# Patient Record
Sex: FEMALE | Race: WHITE | NOT HISPANIC OR LATINO | Employment: FULL TIME | URBAN - METROPOLITAN AREA
[De-identification: names, ages, dates, MRNs, and addresses within clinical notes are randomized per-mention and may not be internally consistent; named-entity substitution may affect disease eponyms.]

---

## 2018-12-01 ENCOUNTER — HOSPITAL ENCOUNTER (OUTPATIENT)
Dept: LAB | Facility: HOSPITAL | Age: 33
Discharge: HOME | End: 2018-12-01
Attending: OBSTETRICS & GYNECOLOGY | Admitting: OBSTETRICS & GYNECOLOGY

## 2018-12-01 LAB
ABO + RH BLD: NORMAL
ARMBAND: NORMAL
BASOPHILS # BLD AUTO: 0.1 10*3/UL (ref 0–0.2)
BASOPHILS NFR BLD AUTO: 1 % (ref 0–2)
BLD COMPONENT TYPE: NORMAL
BLD GP AB SCN SERPL QL: NEGATIVE
CROSSMATCH EXPIRATION: NORMAL
DIFFERENTIAL METHOD BLD: (no result)
EOSINOPHIL # BLD AUTO: 0.1 10*3/UL (ref 0–0.3)
EOSINOPHIL # BLD AUTO: 1 % (ref 0–3)
ERYTHROCYTE [DISTWIDTH] IN BLOOD BY AUTOMATED COUNT: 14.4 % (ref 11.5–14.5)
HCG INTACT+B SERPL-ACNC: <1 MIU/ML
HCT VFR BLD AUTO: 41.4 % (ref 35–49)
HGB BLD-MCNC: 13.7 G/DL (ref 12–15)
LYMPHOCYTES # BLD AUTO: 1.8 10*3/UL (ref 0.8–4.8)
LYMPHOCYTES NFR BLD AUTO: 21 % (ref 18–42)
MCH RBC QN AUTO: 29.5 PG (ref 26–32)
MCHC RBC AUTO-ENTMCNC: 33.2 G/DL (ref 32–36)
MCV RBC AUTO: 89 FL (ref 80–94)
MONOCYTES # BLD AUTO: 0.6 10*3/UL (ref 0.1–1.3)
MONOCYTES NFR BLD AUTO: 6 % (ref 2–11)
NEUTROPHILS # BLD AUTO: 6.2 10*3/UL (ref 2.3–8.6)
NEUTROPHILS NFR BLD AUTO: 71 % (ref 50–75)
NRBC BLD AUTO-RTO: 0 /100{WBCS}
NRBC/RBC NFR BLD MANUAL: 0 10*3/UL
PLATELET # BLD AUTO: 315 10*3/UL (ref 150–450)
PMV BLD AUTO: 8.1 FL (ref 7.4–10.4)
RBC # BLD AUTO: 4.65 10*6/UL (ref 4–5.4)
WBC # BLD AUTO: 8.7 10*3/UL (ref 4.5–11.5)

## 2018-12-06 ENCOUNTER — HOSPITAL ENCOUNTER (OUTPATIENT)
Dept: OTHER | Facility: HOSPITAL | Age: 33
Setting detail: SPECIMEN
Discharge: HOME | End: 2018-12-06
Attending: OBSTETRICS & GYNECOLOGY | Admitting: OBSTETRICS & GYNECOLOGY

## 2019-12-13 ENCOUNTER — OFFICE VISIT (OUTPATIENT)
Dept: SURGERY | Facility: CLINIC | Age: 34
End: 2019-12-13

## 2019-12-13 ENCOUNTER — PREP FOR SURGERY (OUTPATIENT)
Dept: OTHER | Facility: HOSPITAL | Age: 34
End: 2019-12-13

## 2019-12-13 VITALS
DIASTOLIC BLOOD PRESSURE: 78 MMHG | HEIGHT: 65 IN | OXYGEN SATURATION: 98 % | BODY MASS INDEX: 25.49 KG/M2 | WEIGHT: 153 LBS | HEART RATE: 70 BPM | SYSTOLIC BLOOD PRESSURE: 105 MMHG

## 2019-12-13 DIAGNOSIS — K43.2 INCISIONAL HERNIA, WITHOUT OBSTRUCTION OR GANGRENE: Primary | ICD-10-CM

## 2019-12-13 PROCEDURE — 99204 OFFICE O/P NEW MOD 45 MIN: CPT | Performed by: SURGERY

## 2019-12-13 NOTE — H&P
Gretchen Gaytan is a 34 y.o. female.   No chief complaint on file.    There were no vitals taken for this visit.    HISTORY OF PRESENT ILLNESS:  Patient is a very pleasant 34-year-old female who is scheduled for a vaginal hysterectomy with Dr. Mijares.  She was referred here for consideration of repair of a chronically incarcerated periumbilical incisional hernia at the same time.  She reports that the hernia has been present since the birth of her second child about 10 years ago.  It remains chronically incarcerated and sometimes is bigger and sometimes a smaller.  She denies obstructive symptoms.  She is tender in this area.      The following portions of the patient's history were reviewed and updated as appropriate: allergies, current medications, past family history, past medical history, past social history, past surgical history and problem list.    Review of Systems   Constitutional: Negative for activity change and diaphoresis.   HENT: Negative.  Negative for sore throat and voice change.    Eyes: Negative for blurred vision.   Respiratory: Negative for wheezing.    Cardiovascular: Negative for chest pain.   Gastrointestinal:        Periumbilical incisional hernia tenderness   Endocrine: Negative.  Negative for polyuria.   Genitourinary: Negative for urinary incontinence.   Musculoskeletal: Negative for arthralgias.   Skin: Negative for color change.   Neurological: Negative for dizziness, speech difficulty and confusion.   Hematological: Does not bruise/bleed easily.   Psychiatric/Behavioral: Negative for agitation.       Objective   Physical Exam   Constitutional: She is oriented to person, place, and time. She appears well-developed and well-nourished.   HENT:   Head: Normocephalic and atraumatic.   Eyes: EOM are normal.   Neck: Normal range of motion.   Cardiovascular: Normal rate.   Pulmonary/Chest: Effort normal.   Abdominal: Soft.   Chronically incarcerated incisional hernia, small,  tender   Musculoskeletal: Normal range of motion.   Neurological: She is alert and oriented to person, place, and time.   Skin: Skin is warm and dry.   Psychiatric: She has a normal mood and affect.         Assessment/Plan   There are no diagnoses linked to this encounter.Please schedule open incisional hernia repair with mesh, coordinate with Dr. Mijares for her vaginal hysterectomy.  Discussed the risk of general anesthesia bleeding infection injury to surrounding structures.  Also discussed that we may or may not use mesh depending on the size of the actual hernia defect.    Gill Garcia MD  12/13/2019  10:49 AM

## 2019-12-20 PROBLEM — D06.9 CIN III WITH SEVERE DYSPLASIA: Status: ACTIVE | Noted: 2019-12-20

## 2020-01-14 ENCOUNTER — APPOINTMENT (OUTPATIENT)
Dept: PREADMISSION TESTING | Facility: HOSPITAL | Age: 35
End: 2020-01-14

## 2020-01-14 VITALS
HEIGHT: 65 IN | DIASTOLIC BLOOD PRESSURE: 71 MMHG | HEART RATE: 75 BPM | BODY MASS INDEX: 23.72 KG/M2 | SYSTOLIC BLOOD PRESSURE: 105 MMHG | WEIGHT: 142.4 LBS | OXYGEN SATURATION: 99 %

## 2020-01-14 DIAGNOSIS — Z00.00 ROUTINE GENERAL MEDICAL EXAMINATION AT A HEALTH CARE FACILITY: Primary | ICD-10-CM

## 2020-01-14 LAB
ABO GROUP BLD: NORMAL
BLD GP AB SCN SERPL QL: NEGATIVE
DEPRECATED RDW RBC AUTO: 41.1 FL (ref 37–54)
ERYTHROCYTE [DISTWIDTH] IN BLOOD BY AUTOMATED COUNT: 13 % (ref 12.3–15.4)
HCT VFR BLD AUTO: 43.5 % (ref 34–46.6)
HGB BLD-MCNC: 14.5 G/DL (ref 12–15.9)
MCH RBC QN AUTO: 30 PG (ref 26.6–33)
MCHC RBC AUTO-ENTMCNC: 33.4 G/DL (ref 31.5–35.7)
MCV RBC AUTO: 89.6 FL (ref 79–97)
MRSA DNA SPEC QL NAA+PROBE: NORMAL
PLATELET # BLD AUTO: 389 10*3/MM3 (ref 140–450)
PMV BLD AUTO: 7.8 FL (ref 6–12)
RBC # BLD AUTO: 4.85 10*6/MM3 (ref 3.77–5.28)
RH BLD: POSITIVE
T&S EXPIRATION DATE: NORMAL
WBC NRBC COR # BLD: 11.1 10*3/MM3 (ref 3.4–10.8)

## 2020-01-14 PROCEDURE — 86900 BLOOD TYPING SEROLOGIC ABO: CPT | Performed by: SURGERY

## 2020-01-14 PROCEDURE — 87641 MR-STAPH DNA AMP PROBE: CPT | Performed by: SURGERY

## 2020-01-14 PROCEDURE — 86850 RBC ANTIBODY SCREEN: CPT | Performed by: SURGERY

## 2020-01-14 PROCEDURE — 86901 BLOOD TYPING SEROLOGIC RH(D): CPT | Performed by: SURGERY

## 2020-01-14 PROCEDURE — 85027 COMPLETE CBC AUTOMATED: CPT | Performed by: SURGERY

## 2020-01-14 PROCEDURE — 86901 BLOOD TYPING SEROLOGIC RH(D): CPT

## 2020-01-14 PROCEDURE — 36415 COLL VENOUS BLD VENIPUNCTURE: CPT

## 2020-01-14 PROCEDURE — 93005 ELECTROCARDIOGRAM TRACING: CPT

## 2020-01-14 PROCEDURE — 86900 BLOOD TYPING SEROLOGIC ABO: CPT

## 2020-01-15 PROCEDURE — 93010 ELECTROCARDIOGRAM REPORT: CPT | Performed by: INTERNAL MEDICINE

## 2020-01-17 ENCOUNTER — PREP FOR SURGERY (OUTPATIENT)
Dept: OTHER | Facility: HOSPITAL | Age: 35
End: 2020-01-17

## 2020-01-17 NOTE — H&P
Patient Care Team:  Marylou Merdeith as PCP - General    Chief complaint Persistent dysplasia    Subjective     Patient is a 34 y.o. female presents for scheduled hysterectomy d/t persistent/ recurrent cervical dysplasia.  In  had high grade dysplasia but had not completed childbearing so had a LEEP.  She had somewhat consistent follow up after that time and most recent pap was ASCUS HPV positive with colposcopy showing high grade dysplasia at 12 oclock with p 16 positive.  LEEP was benign.  Has now completed childbearing, has had a tubal and salpingectomy and desires permanent surgical mgmt of recurrent dysplasia.  Plans to also have hernia repair c Dr. Garcia at same time as hysterectomy.      Review of Systems   Pertinent items are noted in HPI    History    OB Hx Term  x 3 (, , ) Ectopic c salpingectomy     Past Medical History:   Diagnosis Date   • Ruptured, fallopian tube 2018    right side      Past Surgical History:   Procedure Laterality Date   • BLADDER SUSPENSION     • OTHER SURGICAL HISTORY      uterus ablation    • SALPINGECTOMY Right 2018    right    • TUBAL ABDOMINAL LIGATION Left 2019     No family history on file.  Social History     Tobacco Use   • Smoking status: Never Smoker   • Smokeless tobacco: Never Used   Substance Use Topics   • Alcohol use: Yes     Comment: socially    • Drug use: Never       Allergies  Allergies   Allergen Reactions   • Contrast Dye Itching       Medications    (Not in a hospital admission)    Objective     Vital Signs  There were no vitals filed for this visit.    Physical Exam:      General Appearance:    Alert, cooperative, in no acute distress   Lungs:     Clear to auscultation,respirations regular.    Heart:    Regular rhythm and normal rate.   Abdomen:     Normal bowel sounds, no masses, soft non-tender, non-distended, no guarding, no rebound tenderness   Genitalia:    cx smooth / closed, uterus not enlarged, adnexae wnl.     Extremities:   Moves all extremities well, no edema, no cyanosis, no              redness         Assessment/Plan       35 yo c recurrent cervical dysplasia desiring permanent surgical mgmt.  Plan for TVH.    R/B/A reviewed, see office notes for full counselling.        Annette Mijares MD  01/17/20  11:59 AM

## 2020-01-17 NOTE — H&P (VIEW-ONLY)
Patient Care Team:  Marylou Meredith as PCP - General    Chief complaint Persistent dysplasia    Subjective     Patient is a 34 y.o. female presents for scheduled hysterectomy d/t persistent/ recurrent cervical dysplasia.  In  had high grade dysplasia but had not completed childbearing so had a LEEP.  She had somewhat consistent follow up after that time and most recent pap was ASCUS HPV positive with colposcopy showing high grade dysplasia at 12 oclock with p 16 positive.  LEEP was benign.  Has now completed childbearing, has had a tubal and salpingectomy and desires permanent surgical mgmt of recurrent dysplasia.  Plans to also have hernia repair c Dr. Garcia at same time as hysterectomy.      Review of Systems   Pertinent items are noted in HPI    History    OB Hx Term  x 3 (, , ) Ectopic c salpingectomy     Past Medical History:   Diagnosis Date   • Ruptured, fallopian tube 2018    right side      Past Surgical History:   Procedure Laterality Date   • BLADDER SUSPENSION     • OTHER SURGICAL HISTORY      uterus ablation    • SALPINGECTOMY Right 2018    right    • TUBAL ABDOMINAL LIGATION Left 2019     No family history on file.  Social History     Tobacco Use   • Smoking status: Never Smoker   • Smokeless tobacco: Never Used   Substance Use Topics   • Alcohol use: Yes     Comment: socially    • Drug use: Never       Allergies  Allergies   Allergen Reactions   • Contrast Dye Itching       Medications    (Not in a hospital admission)    Objective     Vital Signs  There were no vitals filed for this visit.    Physical Exam:      General Appearance:    Alert, cooperative, in no acute distress   Lungs:     Clear to auscultation,respirations regular.    Heart:    Regular rhythm and normal rate.   Abdomen:     Normal bowel sounds, no masses, soft non-tender, non-distended, no guarding, no rebound tenderness   Genitalia:    cx smooth / closed, uterus not enlarged, adnexae wnl.     Extremities:   Moves all extremities well, no edema, no cyanosis, no              redness         Assessment/Plan       35 yo c recurrent cervical dysplasia desiring permanent surgical mgmt.  Plan for TVH.    R/B/A reviewed, see office notes for full counselling.        Annette Mijares MD  01/17/20  11:59 AM

## 2020-01-20 ENCOUNTER — ANESTHESIA EVENT (OUTPATIENT)
Dept: PERIOP | Facility: HOSPITAL | Age: 35
End: 2020-01-20

## 2020-01-21 ENCOUNTER — ANESTHESIA (OUTPATIENT)
Dept: PERIOP | Facility: HOSPITAL | Age: 35
End: 2020-01-21

## 2020-01-21 ENCOUNTER — HOSPITAL ENCOUNTER (INPATIENT)
Facility: HOSPITAL | Age: 35
LOS: 1 days | Discharge: HOME | End: 2020-01-22
Attending: OBSTETRICS & GYNECOLOGY | Admitting: OBSTETRICS & GYNECOLOGY

## 2020-01-21 DIAGNOSIS — D06.9 CIN III WITH SEVERE DYSPLASIA: ICD-10-CM

## 2020-01-21 DIAGNOSIS — K43.2 INCISIONAL HERNIA, WITHOUT OBSTRUCTION OR GANGRENE: ICD-10-CM

## 2020-01-21 LAB
ANION GAP SERPL CALCULATED.3IONS-SCNC: 9 MMOL/L (ref 5–15)
B-HCG UR QL: NEGATIVE
BUN BLD-MCNC: 16 MG/DL (ref 6–20)
BUN/CREAT SERPL: 18.6 (ref 7–25)
CALCIUM SPEC-SCNC: 9 MG/DL (ref 8.6–10.5)
CHLORIDE SERPL-SCNC: 105 MMOL/L (ref 98–107)
CO2 SERPL-SCNC: 25 MMOL/L (ref 22–29)
CREAT BLD-MCNC: 0.86 MG/DL (ref 0.57–1)
GFR SERPL CREATININE-BSD FRML MDRD: 76 ML/MIN/1.73
GLUCOSE BLD-MCNC: 94 MG/DL (ref 65–99)
POTASSIUM BLD-SCNC: 4.1 MMOL/L (ref 3.5–5.2)
SODIUM BLD-SCNC: 139 MMOL/L (ref 136–145)

## 2020-01-21 PROCEDURE — 25010000002 MIDAZOLAM PER 1 MG: Performed by: ANESTHESIOLOGY

## 2020-01-21 PROCEDURE — 25010000002 HYDROMORPHONE PER 4 MG: Performed by: ANESTHESIOLOGY

## 2020-01-21 PROCEDURE — 0WUF0JZ SUPPLEMENT ABDOMINAL WALL WITH SYNTHETIC SUBSTITUTE, OPEN APPROACH: ICD-10-PCS | Performed by: SURGERY

## 2020-01-21 PROCEDURE — 49568 PR IMPLANT MESH HERNIA REPAIR/DEBRIDEMENT CLOSURE: CPT | Performed by: SURGERY

## 2020-01-21 PROCEDURE — 0UT67ZZ RESECTION OF LEFT FALLOPIAN TUBE, VIA NATURAL OR ARTIFICIAL OPENING: ICD-10-PCS | Performed by: OBSTETRICS & GYNECOLOGY

## 2020-01-21 PROCEDURE — 49561 PR REPAIR INCISIONAL HERNIA,STRANG: CPT | Performed by: SURGERY

## 2020-01-21 PROCEDURE — 25010000002 PHENYLEPHRINE 10 MG/ML SOLUTION: Performed by: ANESTHESIOLOGY

## 2020-01-21 PROCEDURE — 25010000002 KETOROLAC TROMETHAMINE PER 15 MG: Performed by: ANESTHESIOLOGY

## 2020-01-21 PROCEDURE — 81025 URINE PREGNANCY TEST: CPT | Performed by: OBSTETRICS & GYNECOLOGY

## 2020-01-21 PROCEDURE — C1781 MESH (IMPLANTABLE): HCPCS | Performed by: OBSTETRICS & GYNECOLOGY

## 2020-01-21 PROCEDURE — 94762 N-INVAS EAR/PLS OXIMTRY CONT: CPT

## 2020-01-21 PROCEDURE — 25010000002 PROPOFOL 200 MG/20ML EMULSION: Performed by: ANESTHESIOLOGY

## 2020-01-21 PROCEDURE — 25010000002 ONDANSETRON PER 1 MG: Performed by: ANESTHESIOLOGY

## 2020-01-21 PROCEDURE — 88307 TISSUE EXAM BY PATHOLOGIST: CPT | Performed by: OBSTETRICS & GYNECOLOGY

## 2020-01-21 PROCEDURE — 0UT97ZZ RESECTION OF UTERUS, VIA NATURAL OR ARTIFICIAL OPENING: ICD-10-PCS | Performed by: OBSTETRICS & GYNECOLOGY

## 2020-01-21 PROCEDURE — 25010000002 FENTANYL CITRATE (PF) 250 MCG/5ML SOLUTION: Performed by: ANESTHESIOLOGY

## 2020-01-21 PROCEDURE — 80048 BASIC METABOLIC PNL TOTAL CA: CPT | Performed by: SURGERY

## 2020-01-21 PROCEDURE — 25010000002 DEXAMETHASONE PER 1 MG: Performed by: ANESTHESIOLOGY

## 2020-01-21 DEVICE — VENTRALEX ST HERNIA PATCH
Type: IMPLANTABLE DEVICE | Site: ABDOMEN | Status: FUNCTIONAL
Brand: VENTRALEX ST HERNIA PATCH

## 2020-01-21 RX ORDER — SODIUM CHLORIDE, SODIUM LACTATE, POTASSIUM CHLORIDE, CALCIUM CHLORIDE 600; 310; 30; 20 MG/100ML; MG/100ML; MG/100ML; MG/100ML
125 INJECTION, SOLUTION INTRAVENOUS CONTINUOUS
Status: DISCONTINUED | OUTPATIENT
Start: 2020-01-21 | End: 2020-01-22 | Stop reason: HOSPADM

## 2020-01-21 RX ORDER — NALOXONE HCL 0.4 MG/ML
0.1 VIAL (ML) INJECTION
Status: DISCONTINUED | OUTPATIENT
Start: 2020-01-21 | End: 2020-01-22 | Stop reason: HOSPADM

## 2020-01-21 RX ORDER — MORPHINE SULFATE 1 MG/ML
INJECTION INTRAVENOUS CONTINUOUS
Status: DISCONTINUED | OUTPATIENT
Start: 2020-01-21 | End: 2020-01-22 | Stop reason: HOSPADM

## 2020-01-21 RX ORDER — KETOROLAC TROMETHAMINE 30 MG/ML
INJECTION, SOLUTION INTRAMUSCULAR; INTRAVENOUS AS NEEDED
Status: DISCONTINUED | OUTPATIENT
Start: 2020-01-21 | End: 2020-01-21 | Stop reason: SURG

## 2020-01-21 RX ORDER — LIDOCAINE HYDROCHLORIDE 10 MG/ML
0.5 INJECTION, SOLUTION EPIDURAL; INFILTRATION; INTRACAUDAL; PERINEURAL ONCE AS NEEDED
Status: DISCONTINUED | OUTPATIENT
Start: 2020-01-21 | End: 2020-01-21 | Stop reason: HOSPADM

## 2020-01-21 RX ORDER — SODIUM CHLORIDE 0.9 % (FLUSH) 0.9 %
10 SYRINGE (ML) INJECTION EVERY 12 HOURS SCHEDULED
Status: DISCONTINUED | OUTPATIENT
Start: 2020-01-21 | End: 2020-01-21 | Stop reason: HOSPADM

## 2020-01-21 RX ORDER — ACETAMINOPHEN 325 MG/1
650 TABLET ORAL EVERY 6 HOURS PRN
Status: DISCONTINUED | OUTPATIENT
Start: 2020-01-21 | End: 2020-01-22 | Stop reason: HOSPADM

## 2020-01-21 RX ORDER — NEOSTIGMINE METHYLSULFATE 5 MG/5 ML
SYRINGE (ML) INTRAVENOUS AS NEEDED
Status: DISCONTINUED | OUTPATIENT
Start: 2020-01-21 | End: 2020-01-21 | Stop reason: SURG

## 2020-01-21 RX ORDER — SCOLOPAMINE TRANSDERMAL SYSTEM 1 MG/1
1 PATCH, EXTENDED RELEASE TRANSDERMAL CONTINUOUS
Status: DISCONTINUED | OUTPATIENT
Start: 2020-01-21 | End: 2020-01-21

## 2020-01-21 RX ORDER — MIDAZOLAM HYDROCHLORIDE 1 MG/ML
1 INJECTION INTRAMUSCULAR; INTRAVENOUS
Status: DISCONTINUED | OUTPATIENT
Start: 2020-01-21 | End: 2020-01-21 | Stop reason: HOSPADM

## 2020-01-21 RX ORDER — PHENYLEPHRINE HYDROCHLORIDE 10 MG/ML
INJECTION INTRAVENOUS AS NEEDED
Status: DISCONTINUED | OUTPATIENT
Start: 2020-01-21 | End: 2020-01-21 | Stop reason: SURG

## 2020-01-21 RX ORDER — FENTANYL CITRATE 50 UG/ML
INJECTION, SOLUTION INTRAMUSCULAR; INTRAVENOUS AS NEEDED
Status: DISCONTINUED | OUTPATIENT
Start: 2020-01-21 | End: 2020-01-21 | Stop reason: SURG

## 2020-01-21 RX ORDER — BUPIVACAINE HYDROCHLORIDE 2.5 MG/ML
INJECTION, SOLUTION INFILTRATION; PERINEURAL AS NEEDED
Status: DISCONTINUED | OUTPATIENT
Start: 2020-01-21 | End: 2020-01-21 | Stop reason: HOSPADM

## 2020-01-21 RX ORDER — OXYCODONE HYDROCHLORIDE 5 MG/1
5 TABLET ORAL EVERY 4 HOURS PRN
Status: DISCONTINUED | OUTPATIENT
Start: 2020-01-21 | End: 2020-01-22 | Stop reason: HOSPADM

## 2020-01-21 RX ORDER — PROPOFOL 10 MG/ML
INJECTION, EMULSION INTRAVENOUS AS NEEDED
Status: DISCONTINUED | OUTPATIENT
Start: 2020-01-21 | End: 2020-01-21 | Stop reason: SURG

## 2020-01-21 RX ORDER — DIPHENHYDRAMINE HYDROCHLORIDE 50 MG/ML
12.5 INJECTION INTRAMUSCULAR; INTRAVENOUS
Status: DISCONTINUED | OUTPATIENT
Start: 2020-01-21 | End: 2020-01-21 | Stop reason: HOSPADM

## 2020-01-21 RX ORDER — ACETAMINOPHEN 500 MG
500 TABLET ORAL ONCE
Status: COMPLETED | OUTPATIENT
Start: 2020-01-21 | End: 2020-01-21

## 2020-01-21 RX ORDER — SODIUM CHLORIDE 0.9 % (FLUSH) 0.9 %
3-10 SYRINGE (ML) INJECTION AS NEEDED
Status: DISCONTINUED | OUTPATIENT
Start: 2020-01-21 | End: 2020-01-21 | Stop reason: HOSPADM

## 2020-01-21 RX ORDER — VASOPRESSIN 20 U/ML
INJECTION PARENTERAL AS NEEDED
Status: DISCONTINUED | OUTPATIENT
Start: 2020-01-21 | End: 2020-01-21 | Stop reason: HOSPADM

## 2020-01-21 RX ORDER — FAMOTIDINE 10 MG/ML
20 INJECTION, SOLUTION INTRAVENOUS ONCE
Status: COMPLETED | OUTPATIENT
Start: 2020-01-21 | End: 2020-01-21

## 2020-01-21 RX ORDER — IBUPROFEN 600 MG/1
600 TABLET ORAL EVERY 6 HOURS PRN
Status: DISCONTINUED | OUTPATIENT
Start: 2020-01-21 | End: 2020-01-22 | Stop reason: HOSPADM

## 2020-01-21 RX ORDER — PANTOPRAZOLE SODIUM 40 MG/1
40 TABLET, DELAYED RELEASE ORAL
Status: DISCONTINUED | OUTPATIENT
Start: 2020-01-21 | End: 2020-01-22 | Stop reason: HOSPADM

## 2020-01-21 RX ORDER — ROCURONIUM BROMIDE 10 MG/ML
INJECTION, SOLUTION INTRAVENOUS AS NEEDED
Status: DISCONTINUED | OUTPATIENT
Start: 2020-01-21 | End: 2020-01-21 | Stop reason: SURG

## 2020-01-21 RX ORDER — DOCUSATE SODIUM 100 MG/1
100 CAPSULE, LIQUID FILLED ORAL 2 TIMES DAILY
Status: DISCONTINUED | OUTPATIENT
Start: 2020-01-21 | End: 2020-01-22 | Stop reason: HOSPADM

## 2020-01-21 RX ORDER — HYDROMORPHONE HCL 110MG/55ML
0.5 PATIENT CONTROLLED ANALGESIA SYRINGE INTRAVENOUS
Status: DISCONTINUED | OUTPATIENT
Start: 2020-01-21 | End: 2020-01-21 | Stop reason: HOSPADM

## 2020-01-21 RX ORDER — GLYCOPYRROLATE 0.2 MG/ML
INJECTION INTRAMUSCULAR; INTRAVENOUS AS NEEDED
Status: DISCONTINUED | OUTPATIENT
Start: 2020-01-21 | End: 2020-01-21 | Stop reason: SURG

## 2020-01-21 RX ORDER — MEPERIDINE HYDROCHLORIDE 25 MG/ML
12.5 INJECTION INTRAMUSCULAR; INTRAVENOUS; SUBCUTANEOUS
Status: DISCONTINUED | OUTPATIENT
Start: 2020-01-21 | End: 2020-01-21 | Stop reason: HOSPADM

## 2020-01-21 RX ORDER — EPHEDRINE SULFATE 50 MG/ML
INJECTION INTRAVENOUS AS NEEDED
Status: DISCONTINUED | OUTPATIENT
Start: 2020-01-21 | End: 2020-01-21 | Stop reason: SURG

## 2020-01-21 RX ORDER — SODIUM CHLORIDE 0.9 % (FLUSH) 0.9 %
3 SYRINGE (ML) INJECTION EVERY 12 HOURS SCHEDULED
Status: DISCONTINUED | OUTPATIENT
Start: 2020-01-21 | End: 2020-01-21 | Stop reason: HOSPADM

## 2020-01-21 RX ORDER — SODIUM CHLORIDE, SODIUM LACTATE, POTASSIUM CHLORIDE, AND CALCIUM CHLORIDE .6; .31; .03; .02 G/100ML; G/100ML; G/100ML; G/100ML
INJECTION, SOLUTION INTRAVENOUS AS NEEDED
Status: DISCONTINUED | OUTPATIENT
Start: 2020-01-21 | End: 2020-01-22 | Stop reason: HOSPADM

## 2020-01-21 RX ORDER — SODIUM CHLORIDE, SODIUM LACTATE, POTASSIUM CHLORIDE, CALCIUM CHLORIDE 600; 310; 30; 20 MG/100ML; MG/100ML; MG/100ML; MG/100ML
9 INJECTION, SOLUTION INTRAVENOUS CONTINUOUS PRN
Status: DISCONTINUED | OUTPATIENT
Start: 2020-01-21 | End: 2020-01-21 | Stop reason: HOSPADM

## 2020-01-21 RX ORDER — SODIUM CHLORIDE, SODIUM LACTATE, POTASSIUM CHLORIDE, CALCIUM CHLORIDE 600; 310; 30; 20 MG/100ML; MG/100ML; MG/100ML; MG/100ML
9 INJECTION, SOLUTION INTRAVENOUS CONTINUOUS
Status: DISCONTINUED | OUTPATIENT
Start: 2020-01-21 | End: 2020-01-21

## 2020-01-21 RX ORDER — SIMETHICONE 80 MG
80 TABLET,CHEWABLE ORAL 4 TIMES DAILY PRN
Status: DISCONTINUED | OUTPATIENT
Start: 2020-01-21 | End: 2020-01-22 | Stop reason: HOSPADM

## 2020-01-21 RX ORDER — SODIUM CHLORIDE 0.9 % (FLUSH) 0.9 %
10 SYRINGE (ML) INJECTION AS NEEDED
Status: DISCONTINUED | OUTPATIENT
Start: 2020-01-21 | End: 2020-01-21 | Stop reason: HOSPADM

## 2020-01-21 RX ORDER — ZOLPIDEM TARTRATE 5 MG/1
5 TABLET ORAL NIGHTLY PRN
Status: DISCONTINUED | OUTPATIENT
Start: 2020-01-21 | End: 2020-01-22 | Stop reason: HOSPADM

## 2020-01-21 RX ORDER — LIDOCAINE HYDROCHLORIDE 20 MG/ML
INJECTION, SOLUTION EPIDURAL; INFILTRATION; INTRACAUDAL; PERINEURAL AS NEEDED
Status: DISCONTINUED | OUTPATIENT
Start: 2020-01-21 | End: 2020-01-21 | Stop reason: SURG

## 2020-01-21 RX ORDER — DEXAMETHASONE SODIUM PHOSPHATE 4 MG/ML
INJECTION, SOLUTION INTRA-ARTICULAR; INTRALESIONAL; INTRAMUSCULAR; INTRAVENOUS; SOFT TISSUE AS NEEDED
Status: DISCONTINUED | OUTPATIENT
Start: 2020-01-21 | End: 2020-01-21 | Stop reason: SURG

## 2020-01-21 RX ORDER — ONDANSETRON 2 MG/ML
4 INJECTION INTRAMUSCULAR; INTRAVENOUS ONCE AS NEEDED
Status: COMPLETED | OUTPATIENT
Start: 2020-01-21 | End: 2020-01-21

## 2020-01-21 RX ADMIN — ROCURONIUM BROMIDE 30 MG: 10 INJECTION, SOLUTION INTRAVENOUS at 07:49

## 2020-01-21 RX ADMIN — HYDROMORPHONE HYDROCHLORIDE 0.5 MG: 2 INJECTION, SOLUTION INTRAMUSCULAR; INTRAVENOUS; SUBCUTANEOUS at 10:12

## 2020-01-21 RX ADMIN — FENTANYL CITRATE 100 MCG: 50 INJECTION, SOLUTION INTRAMUSCULAR; INTRAVENOUS at 08:29

## 2020-01-21 RX ADMIN — Medication 3 MG: at 09:15

## 2020-01-21 RX ADMIN — LIDOCAINE HYDROCHLORIDE 65 MG: 20 INJECTION, SOLUTION EPIDURAL; INFILTRATION; INTRACAUDAL; PERINEURAL at 07:48

## 2020-01-21 RX ADMIN — PROPOFOL 30 MG: 10 INJECTION, EMULSION INTRAVENOUS at 09:01

## 2020-01-21 RX ADMIN — SODIUM CHLORIDE, SODIUM LACTATE, POTASSIUM CHLORIDE, AND CALCIUM CHLORIDE 9 ML/HR: 600; 310; 30; 20 INJECTION, SOLUTION INTRAVENOUS at 06:18

## 2020-01-21 RX ADMIN — EPHEDRINE SULFATE 10 MG: 50 INJECTION, SOLUTION INTRAVENOUS at 08:21

## 2020-01-21 RX ADMIN — GLYCOPYRROLATE 0.4 MG: 0.2 INJECTION, SOLUTION INTRAMUSCULAR; INTRAVENOUS at 09:15

## 2020-01-21 RX ADMIN — PHENYLEPHRINE HYDROCHLORIDE 0.1 MG: 10 INJECTION INTRAVENOUS at 09:06

## 2020-01-21 RX ADMIN — FENTANYL CITRATE 100 MCG: 50 INJECTION, SOLUTION INTRAMUSCULAR; INTRAVENOUS at 07:48

## 2020-01-21 RX ADMIN — PHENYLEPHRINE HYDROCHLORIDE 0.1 MG: 10 INJECTION INTRAVENOUS at 08:52

## 2020-01-21 RX ADMIN — OXYCODONE HYDROCHLORIDE 5 MG: 5 TABLET ORAL at 16:08

## 2020-01-21 RX ADMIN — PHENYLEPHRINE HYDROCHLORIDE 0.1 MG: 10 INJECTION INTRAVENOUS at 08:38

## 2020-01-21 RX ADMIN — DOCUSATE SODIUM 100 MG: 100 CAPSULE, LIQUID FILLED ORAL at 20:12

## 2020-01-21 RX ADMIN — SODIUM CHLORIDE, SODIUM LACTATE, POTASSIUM CHLORIDE, AND CALCIUM CHLORIDE 125 ML/HR: .6; .31; .03; .02 INJECTION, SOLUTION INTRAVENOUS at 14:59

## 2020-01-21 RX ADMIN — FAMOTIDINE 20 MG: 10 INJECTION INTRAVENOUS at 07:32

## 2020-01-21 RX ADMIN — PROPOFOL 130 MG: 10 INJECTION, EMULSION INTRAVENOUS at 07:48

## 2020-01-21 RX ADMIN — OXYCODONE HYDROCHLORIDE 5 MG: 5 TABLET ORAL at 12:35

## 2020-01-21 RX ADMIN — SODIUM CHLORIDE, SODIUM LACTATE, POTASSIUM CHLORIDE, AND CALCIUM CHLORIDE 125 ML/HR: .6; .31; .03; .02 INJECTION, SOLUTION INTRAVENOUS at 22:56

## 2020-01-21 RX ADMIN — SCOPALAMINE 1 PATCH: 1 PATCH, EXTENDED RELEASE TRANSDERMAL at 07:30

## 2020-01-21 RX ADMIN — ACETAMINOPHEN 500 MG: 500 TABLET, FILM COATED ORAL at 07:31

## 2020-01-21 RX ADMIN — PHENYLEPHRINE HYDROCHLORIDE 0.1 MG: 10 INJECTION INTRAVENOUS at 08:16

## 2020-01-21 RX ADMIN — CEFAZOLIN SODIUM 2 G: 1 INJECTION, POWDER, FOR SOLUTION INTRAMUSCULAR; INTRAVENOUS at 07:55

## 2020-01-21 RX ADMIN — DOCUSATE SODIUM 100 MG: 100 CAPSULE, LIQUID FILLED ORAL at 12:35

## 2020-01-21 RX ADMIN — PROPOFOL 30 MG: 10 INJECTION, EMULSION INTRAVENOUS at 07:50

## 2020-01-21 RX ADMIN — PANTOPRAZOLE SODIUM 40 MG: 40 TABLET, DELAYED RELEASE ORAL at 12:35

## 2020-01-21 RX ADMIN — MIDAZOLAM 2 MG: 1 INJECTION INTRAMUSCULAR; INTRAVENOUS at 07:46

## 2020-01-21 RX ADMIN — DEXAMETHASONE SODIUM PHOSPHATE 4 MG: 4 INJECTION, SOLUTION INTRAMUSCULAR; INTRAVENOUS at 08:25

## 2020-01-21 RX ADMIN — ONDANSETRON 4 MG: 2 INJECTION INTRAMUSCULAR; INTRAVENOUS at 09:13

## 2020-01-21 RX ADMIN — IBUPROFEN 600 MG: 600 TABLET, FILM COATED ORAL at 20:12

## 2020-01-21 RX ADMIN — KETOROLAC TROMETHAMINE 15 MG: 30 INJECTION, SOLUTION INTRAMUSCULAR at 09:18

## 2020-01-21 NOTE — OP NOTE
Subjective     Date of Service:  01/21/20    Pre-operative diagnosis(es): KAREN III, recurrent dysplasia     Post-operative diagnosis(es): same   Procedure(s): TVH with Left Salpingectomy (R tube surgically absent), Herniorrhaphy done under a separate operative note per Dr. Garcia         Surgeon:    Dr. Annette Mijares   Assistant: Dr. Orlando   EBL: 50cc   Antibiotics: cefazolin (Ancef) ordered on call to OR     Anesthesia:  General Anesthesia       Objective      Operative findings: Normal uterus and bilateral ovaries     Description of Procedure:   The patient was seen at the pre-op area. Risks, benefits, indication, and alternatives of the procedure were reviewed with the patient. The patient agreed to the procedure and signed the consent form.  The patient was taken to the OR with IV fluid running and pneumatic compression stockings applied to the lower extremities. General anesthesia was obtained.   The patient was placed in the dorsal lithotomy position with Dinh-type stirrups. The patient was prepared and draped. A Govea catheter was inserted, and the bladder was emptied.    A weighted speculum was placed into the vagina.The cervix was grasped with the Singer tenaculum.  The area of the uterosacral ligaments and cardinal ligaments were injected with a solution of 1/4% Marcaine with 5 units of Vasopressin. The posterior cul-de-sac was then entered sharply with Love scissors . The clear peritoneal fluid was noted. The posterior vagina/ peritoneum were tagged with a suture of 2.0 Vicryl which was used throughout the procedure. The weighted speculum was inserted into the posterior cul-de-sac.  The uterosacral ligaments were bilaterally clamped, cut, and suture-ligated. The bilateral bladder pillars were clamped, cut and suture ligated until the anterior cul de sac could be entered sharply and confirmation of entrance into the cul de sac was performed by visualization of bowel fat.  The cardinal ligaments were  then clamped, cut, and suture ligated. The broad and round ligaments were then clamped, cut, and suture-ligated. The uterus was delivered posteriorly  and then the ovarian ligaments clamped, cut, and doubly ligated. The uterus was removed through the vagina. Ovaries were found to be normal. The R tube was surgically absent.  The Left tube was grasped, clamped with a right angle, excised, and then ligated with 2.0 vicryl.  Good hemostasis was noted.  Examination of all the pedicles revealed good hemostasis.   The peritoneum was closed with a midline suture of 2.0 Vicryl and bilateral purse string sutures were performed to close the remainder of the peritoneum.    The uterosacral ligaments were plicated to the posterior vagina and peritoneum using 0 Vicryl.    The posterior and anterior vagina were closed with 2.0 vicryl.   All instruments were removed from the vagina, and all counts were correct times two. The patient was taken to the recovery room in a stable condition.     Specimens removed:   Uterus, Left tube     Complications:   none     Condition:   stable     Disposition:   to PACU and then admit to  medical - surgical floor               Annette Mijares MD  01/21/20  9:17 AM

## 2020-01-21 NOTE — ANESTHESIA PROCEDURE NOTES
Airway  Urgency: elective    Date/Time: 1/21/2020 7:50 AM  End Time:1/21/2020 7:51 AM    General Information and Staff    Patient location during procedure: OR  Anesthesiologist: Bettina Strange MD    Indications and Patient Condition  Indications for airway management: airway protection    Preoxygenated: yes  Mask difficulty assessment: 1 - vent by mask    Final Airway Details  Final airway type: endotracheal airway      Successful airway: ETT  Cuffed: yes   Successful intubation technique: direct laryngoscopy  Facilitating devices/methods: cricoid pressure  Blade: Tomasz  Blade size: 3  ETT size (mm): 7.0  Cormack-Lehane Classification: grade I - full view of glottis  Placement verified by: chest auscultation and capnometry   Measured from: lips  Number of attempts at approach: 1  Assessment: lips, teeth, and gum same as pre-op and atraumatic intubation

## 2020-01-21 NOTE — ANESTHESIA POSTPROCEDURE EVALUATION
Patient: Candie Gaytan    Procedure Summary     Date:  01/21/20 Room / Location:  [facility] OR 06 / [facility] MAIN OR    Anesthesia Start:  0740 Anesthesia Stop:  0930    Procedures:       TOTAL VAGINAL HYSTERECTOMY WITH LEFT SALPINGECTOMY (Bilateral Vagina)      VENTRAL/INCISIONAL HERNIA REPAIR WITH 4.3 CM MESH (N/A Abdomen) Diagnosis:       KAREN III with severe dysplasia      Incisional hernia, without obstruction or gangrene      (SEVERE DYSPLASIA OF CERVIX )      (Incisional hernia, without obstruction or gangrene [K43.2])    Surgeon:  Annette Mijares MD; Gill Garcia MD Provider:  Bettina Strange MD    Anesthesia Type:  general ASA Status:  1          Anesthesia Type: general    Vitals  Vitals Value Taken Time   BP 93/55 1/21/2020 10:27 AM   Temp 98.3 °F (36.8 °C) 1/21/2020 10:27 AM   Pulse 71 1/21/2020 10:27 AM   Resp 13 1/21/2020 10:27 AM   SpO2 95 % 1/21/2020 10:27 AM   Vitals shown include unvalidated device data.        Post Anesthesia Care and Evaluation    Patient location during evaluation: PACU  Patient participation: complete - patient participated  Level of consciousness: awake  Pain scale: See nurse's notes for pain score.  Pain management: adequate  Airway patency: patent  Anesthetic complications: No anesthetic complications  PONV Status: none  Cardiovascular status: acceptable  Respiratory status: acceptable  Hydration status: acceptable    Comments: Patient seen and examined postoperatively; vital signs stable; SpO2 greater than or equal to 90%; cardiopulmonary status stable; nausea/vomiting adequately controlled; pain adequately controlled; no apparent anesthesia complications; patient discharged from anesthesia care when discharge criteria were met

## 2020-01-21 NOTE — OP NOTE
Operative Note:    Patient Name:  Candie Gaytan  YOB: 1985    Date of Surgery:  1/21/2020     Indications: Patient has a chronically incarcerated periumbilical incisional hernia.  She is undergoing vaginal hysterectomy by Dr. Mijares and upon completion of the hysterectomy I will repair her incarcerated hernia.    Pre-op Diagnosis:   SEVERE DYSPLASIA OF CERVIX   Incisional hernia, without obstruction or gangrene [K43.2]       Post-Op Diagnosis Codes:     * KAREN III with severe dysplasia [D06.9]     * Incisional hernia, without obstruction or gangrene [K43.2]    Procedure/CPT® Codes:      Procedure(s):  TOTAL VAGINAL HYSTERECTOMY WITH LEFT SALPINGECTOMY  VENTRAL/INCISIONAL HERNIA REPAIR WITH 4.3 CM MESH    Staff:  Surgeon(s):  Annette Mijares MD Lankford, Ashley, MD         Anesthesia: General    Estimated Blood Loss: minimal    Implants:    Implant Name Type Inv. Item Serial No.  Lot No. LRB No. Used   MESH PAXTON VENTRALEX ST W/STRAP 1.7 - [id number] Implant MESH PAXTON VENTRALEX ST W/STRAP 1.7  [employer]VOL  (DIV OF CR [employer]) [id number] N/A 1       Specimen:          Specimens     ID Source Type Tests Collected By Collected At Frozen?      A Uterus, Cervix, L Fallopian Tube Tissue · TISSUE PATHOLOGY EXAM   Annette Mijares MD 1/21/20 0834      Description: UTERUS, CERVIX, LEFT FALLOPIAN TUBE              Findings: Chronically incarcerated periumbilical incisional hernia    Complications: None    Description of Procedure: Dr. Mijares completed her procedure and then a timeout was performed for the hernia repair.  I made a 3 cm curvilinear periumbilical incision in the region of the previous port site.  The hernia sac was identified dissected and excised the contents were reduced.  I repaired the hernia with a 4.3 cm ventral mesh in an underlay fashion.  The anterior layer of the mesh was incorporated in the primary fascial closure using 0 Ethibond the umbilicus was then reapproximated to  the fascia of the anterior abdominal wall with 3-0 Vicryl and the incision was closed in layers of interrupted 3-0 Vicryl and running 4-0 Vicryl.    Patient tolerated the procedure well.    Sponge and cell counts correct x2.      Gill Garcia MD     Date: 1/21/2020  Time: 9:25 AM

## 2020-01-21 NOTE — ANESTHESIA PREPROCEDURE EVALUATION
Anesthesia Evaluation     Patient summary reviewed and Nursing notes reviewed   NPO Solid Status: > 8 hours  NPO Liquid Status: > 8 hours           Airway   Mallampati: I  TM distance: >3 FB  Neck ROM: full  No difficulty expected  Dental - normal exam     Pulmonary - negative pulmonary ROS and normal exam   Cardiovascular - negative cardio ROS and normal exam        Neuro/Psych- negative ROS  GI/Hepatic/Renal/Endo - negative ROS     Musculoskeletal (-) negative ROS    Abdominal  - normal exam    Bowel sounds: normal.   Substance History - negative use     OB/GYN negative ob/gyn ROS     Comment: KAREN III cervical dysplasia      Other      history of cancer                    Anesthesia Plan    ASA 1     general     intravenous induction     Anesthetic plan, all risks, benefits, and alternatives have been provided, discussed and informed consent has been obtained with: patient.

## 2020-01-21 NOTE — H&P
"Subjective   Candie Shyanne Gaytan is a 34 y.o. female.   No chief complaint on file.    /77   Pulse 71   Temp 98 °F (36.7 °C) (Temporal)   Resp 11   Ht 165.1 cm (65\")   Wt 65.3 kg (143 lb 15.4 oz)   SpO2 99%   BMI 23.96 kg/m²     HISTORY OF PRESENT ILLNESS:  Patient is a very pleasant 34-year-old female who is scheduled for a vaginal hysterectomy with Dr. Mijares.  She was referred here for consideration of repair of a chronically incarcerated periumbilical incisional hernia at the same time.  She reports that the hernia has been present since the birth of her second child about 10 years ago.  It remains chronically incarcerated and sometimes is bigger and sometimes a smaller.  She denies obstructive symptoms.  She is tender in this area.      The following portions of the patient's history were reviewed and updated as appropriate: allergies, current medications, past family history, past medical history, past social history, past surgical history and problem list.    Review of Systems   Constitutional: Negative for activity change and diaphoresis.   HENT: Negative.  Negative for sore throat and voice change.    Eyes: Negative for blurred vision.   Respiratory: Negative for wheezing.    Cardiovascular: Negative for chest pain.   Gastrointestinal:        Periumbilical incisional hernia tenderness   Endocrine: Negative.  Negative for polyuria.   Genitourinary: Negative for urinary incontinence.   Musculoskeletal: Negative for arthralgias.   Skin: Negative for color change.   Neurological: Negative for dizziness, speech difficulty and confusion.   Hematological: Does not bruise/bleed easily.   Psychiatric/Behavioral: Negative for agitation.       Objective   Physical Exam   Constitutional: She is oriented to person, place, and time. She appears well-developed and well-nourished.   HENT:   Head: Normocephalic and atraumatic.   Eyes: EOM are normal.   Neck: Normal range of motion.   Cardiovascular: Normal rate. "   Pulmonary/Chest: Effort normal.   Abdominal: Soft.   Chronically incarcerated incisional hernia, small, tender   Musculoskeletal: Normal range of motion.   Neurological: She is alert and oriented to person, place, and time.   Skin: Skin is warm and dry.   Psychiatric: She has a normal mood and affect.         Assessment/Plan   There are no diagnoses linked to this encounter.Please schedule open incisional hernia repair with mesh, coordinate with Dr. Mijares for her vaginal hysterectomy.  Discussed the risk of general anesthesia bleeding infection injury to surrounding structures.  Also discussed that we may or may not use mesh depending on the size of the actual hernia defect.    Gill Garcia MD  1/21/2020  10:49 AM

## 2020-01-22 VITALS
OXYGEN SATURATION: 97 % | RESPIRATION RATE: 18 BRPM | HEART RATE: 84 BPM | HEIGHT: 65 IN | BODY MASS INDEX: 23.99 KG/M2 | DIASTOLIC BLOOD PRESSURE: 58 MMHG | TEMPERATURE: 98.1 F | WEIGHT: 143.96 LBS | SYSTOLIC BLOOD PRESSURE: 89 MMHG

## 2020-01-22 LAB
BASOPHILS # BLD AUTO: 0 10*3/MM3 (ref 0–0.2)
BASOPHILS NFR BLD AUTO: 0.3 % (ref 0–1.5)
DEPRECATED RDW RBC AUTO: 45.9 FL (ref 37–54)
EOSINOPHIL # BLD AUTO: 0.2 10*3/MM3 (ref 0–0.4)
EOSINOPHIL NFR BLD AUTO: 2.4 % (ref 0.3–6.2)
ERYTHROCYTE [DISTWIDTH] IN BLOOD BY AUTOMATED COUNT: 16.3 % (ref 12.3–15.4)
HCT VFR BLD AUTO: 27.5 % (ref 34–46.6)
HGB BLD-MCNC: 9.2 G/DL (ref 12–15.9)
LAB AP CASE REPORT: NORMAL
LYMPHOCYTES # BLD AUTO: 2.2 10*3/MM3 (ref 0.7–3.1)
LYMPHOCYTES NFR BLD AUTO: 29.5 % (ref 19.6–45.3)
MCH RBC QN AUTO: 26.5 PG (ref 26.6–33)
MCHC RBC AUTO-ENTMCNC: 33.5 G/DL (ref 31.5–35.7)
MCV RBC AUTO: 79.1 FL (ref 79–97)
MONOCYTES # BLD AUTO: 0.7 10*3/MM3 (ref 0.1–0.9)
MONOCYTES NFR BLD AUTO: 8.8 % (ref 5–12)
NEUTROPHILS # BLD AUTO: 4.5 10*3/MM3 (ref 1.7–7)
NEUTROPHILS NFR BLD AUTO: 59 % (ref 42.7–76)
NRBC BLD AUTO-RTO: 0.1 /100 WBC (ref 0–0.2)
PATH REPORT.FINAL DX SPEC: NORMAL
PATH REPORT.GROSS SPEC: NORMAL
PLATELET # BLD AUTO: 325 10*3/MM3 (ref 140–450)
PMV BLD AUTO: 6.4 FL (ref 6–12)
RBC # BLD AUTO: 3.48 10*6/MM3 (ref 3.77–5.28)
WBC NRBC COR # BLD: 7.6 10*3/MM3 (ref 3.4–10.8)

## 2020-01-22 PROCEDURE — 85025 COMPLETE CBC W/AUTO DIFF WBC: CPT | Performed by: OBSTETRICS & GYNECOLOGY

## 2020-01-22 RX ORDER — OXYCODONE HYDROCHLORIDE AND ACETAMINOPHEN 5; 325 MG/1; MG/1
1 TABLET ORAL EVERY 4 HOURS PRN
Qty: 30 TABLET | Refills: 0 | Status: SHIPPED | OUTPATIENT
Start: 2020-01-22

## 2020-01-22 RX ADMIN — ACETAMINOPHEN 650 MG: 325 TABLET ORAL at 01:12

## 2020-01-22 RX ADMIN — DOCUSATE SODIUM 100 MG: 100 CAPSULE, LIQUID FILLED ORAL at 08:18

## 2020-01-22 RX ADMIN — PANTOPRAZOLE SODIUM 40 MG: 40 TABLET, DELAYED RELEASE ORAL at 05:43

## 2020-01-22 RX ADMIN — IBUPROFEN 600 MG: 600 TABLET, FILM COATED ORAL at 05:42

## 2020-01-22 RX ADMIN — OXYCODONE HYDROCHLORIDE 5 MG: 5 TABLET ORAL at 08:18

## 2020-01-22 NOTE — DISCHARGE SUMMARY
Inpatient Discharge Summary    BRIEF OVERVIEW  Admitting Provider: Annette Mijares MD  Discharge Provider: Annette Mijares MD  Primary Care Physician at Discharge: Marylou Meredith [phone]     Admission Date: 1/21/2020     Discharge Date: No discharge date for patient encounter.    Primary Discharge Diagnosis  Status post total vaginal hysterectomy, left salpingectomy, umbilical hernia repair    Secondary Discharge Diagnosis  Cervical dysplasia  Umbilical hernia    Discharge Disposition  Home or Self Care  Code Status at Discharge: Full    Active Issues Requiring Follow-up  Postoperative state    Outpatient Follow-Up  No future appointments.      Test Results Pending at Discharge   Order Current Status    CBC & Differential Collected (01/22/20 0702)    CBC Auto Differential Collected (01/22/20 0702)    Tissue Pathology Exam In process          DETAILS OF HOSPITAL STAY    Presenting Problem/History of Present Illness  Incisional hernia, without obstruction or gangrene [K43.2]  Incisional hernia, without obstruction or gangrene [K43.2]  Incisional hernia, without obstruction or gangrene [K43.2]  Cervical dysplasia     Hospital Course  The patient was admitted from pre-op. She underwent total vaginal hysterectomy, left salpingectomy with Dr. Mijares and umbilical hernia repair with Dr. Garcia. Findings included: Normal uterus and bilateral ovaries; Chronically incarcerated periumbilical incisional hernia. See operative note for details. She was taken to PACU postoperatively and subsequently admitted to the floor for observation. On POD1 she was ambulating, tolerating po, having flatus, and pain was controlled. Govea was removed AM of POD1; void is pending this am. BP was stable at 90s/60s without tachycardia.  UOP was adequate. Am CBC was pending. She was deemed stable for discharge home pending spontaneous void and am CBC results. She will be discharged to home with plans to f/u with Dr. Mijares in 2  "weeks.     Operative Procedures Performed  Procedure(s):  TOTAL VAGINAL HYSTERECTOMY WITH LEFT SALPINGECTOMY  VENTRAL/INCISIONAL HERNIA REPAIR WITH 4.3 CM MESH  Treatments: Pain control  Consults: none  Procedures: None  Pertinent Test Results: labs: pending    Physical Exam at Discharge  Discharge Condition: good  Heart Rate: 73  Resp: 16  BP: 92/55  Temp: 98.6 °F (37 °C)  Weight: 65.3 kg (143 lb 15.4 oz)  BP 92/55 (BP Location: Left arm, Patient Position: Lying)   Pulse 73   Temp 98.6 °F (37 °C) (Oral)   Resp 16   Ht 165.1 cm (65\")   Wt 65.3 kg (143 lb 15.4 oz)   SpO2 98%   BMI 23.96 kg/m²       "

## 2020-01-22 NOTE — PROGRESS NOTES
Discharge Planning Assessment  [facility]     Patient Name: Candie Gaytan  MRN: [id number]  Today's Date: 1/22/2020    Admit Date: 1/21/2020    Discharge Needs Assessment     Row Name 01/22/20 0833       Living Environment    Lives With  child(phillip), dependent;spouse    Current Living Arrangements  home/apartment/condo    Primary Care Provided by  self    Provides Primary Care For  child(phillip)    Family Caregiver if Needed  none    Able to Return to Prior Arrangements  yes       Resource/Environmental Concerns    Resource/Environmental Concerns  none    Transportation Concerns  car, none       Transition Planning    Patient/Family Anticipates Transition to  home with family    Patient/Family Anticipated Services at Transition  none    Transportation Anticipated  family or friend will provide       Discharge Needs Assessment    Readmission Within the Last 30 Days  no previous admission in last 30 days    Concerns to be Addressed  no discharge needs identified;denies needs/concerns at this time    Equipment Currently Used at Home  none    Anticipated Changes Related to Illness  none    Equipment Needed After Discharge  none        Discharge Plan     Row Name 01/22/20 0834       Plan    Plan  DC Plan: Home with family     Patient/Family in Agreement with Plan  yes    Plan Comments  Patient states she lives with spouse and young children.  Reports she still drives.  Denies use of DME.  Denies taking any medications daily.  Denies any needs at this time.  PCP: [facility].            Expected Discharge Date and Time     Expected Discharge Date Expected Discharge Time    Jan 22, 2020         Demographic Summary     Row Name 01/22/20 0833       General Information    Admission Type  inpatient    Arrived From  PACU/recovery room    Referral Source  admission list    Reason for Consult  discharge planning    Preferred Language  English     Used During This Interaction  no        Functional Status     Row Name 01/22/20  0833       Functional Status    Usual Activity Tolerance  excellent    Current Activity Tolerance  excellent       Functional Status, IADL    Medications  independent    Meal Preparation  independent    Housekeeping  independent    Laundry  independent    Shopping  independent       Mental Status    General Appearance WDL  WDL       Mental Status Summary    Recent Changes in Mental Status/Cognitive Functioning  no changes        Andreia Lindsay,RN    /Utilization Review  [address] [address]    [phone] office  [phone] fax  [email or web address]  [facility] NPI:   Hospital Tax ID: [id number]

## 2020-01-22 NOTE — PLAN OF CARE
Pt has been taking motrin and tylenol for pain. Urine output has been excellent this shift. Govea cath to be removed at 0600.

## 2020-01-23 ENCOUNTER — READMISSION MANAGEMENT (OUTPATIENT)
Dept: CALL CENTER | Facility: HOSPITAL | Age: 35
End: 2020-01-23

## 2020-01-23 NOTE — OUTREACH NOTE
Prep Survey      Responses   Facility patient discharged from?  [facility]   Is patient eligible?  Yes   Discharge diagnosis  incisional hernia, w/o obstruction or gangrene, s/p total Vaginal hyst. ,left salpingectomy, umbilical hernia repaire, cervical dysplasiea   Does the patient have one of the following disease processes/diagnoses(primary or secondary)?  General Surgery   Does the patient have Home health ordered?  No   Is there a DME ordered?  No   Comments regarding appointments  Pt to schedule F/U appointments.    Prep survey completed?  Yes          Kath Mahmood RN

## 2020-01-23 NOTE — PAYOR COMM NOTE
"Clinical Update: D/C Summary    Auth#[id number]       THANK YOU,  Faye Perez, RN, BSN  /Utilization Review  [facility]  Phone: [phone]  Fax: [phone]      Candie Dumont (34 y.o. Female)     Date of Birth Social Security Number Address Home Phone MRN    1985  [address] [place] [place] [address] [phone] [id number]    Yazdanism Marital Status          None        Admission Date Admission Type Admitting Provider Attending Provider Department, Room/Bed    1/21/20 Elective Annette Mijares MD  [facility] MOTHER BABY, M413/1    Discharge Date Discharge Disposition Discharge Destination        1/22/2020 Home or Self Care              Attending Provider:  (none)   Allergies:  Contrast Dye    Isolation:  None   Infection:  None   Code Status:  Prior    Ht:  165.1 cm (65\")   Wt:  65.3 kg (143 lb 15.4 oz)    Admission Cmt:  None   Principal Problem:  Incisional hernia, without obstruction or gangrene [K43.2]                 Active Insurance as of 1/21/2020     Primary Coverage     Payor Plan Insurance Group Employer/Plan Group    [employer]UE [employer]UE [employer]UE [employer] PPO [id number]     Payor Plan Address Payor Plan Phone Number Payor Plan Fax Number Effective Dates    [address] [phone]  1/1/2019 - None Entered    [place] [address]       Subscriber Name Subscriber Birth Date Member ID       CANDIE DUMONT 1985 [id number]                 Emergency Contacts      (Rel.) Home Phone Work Phone Mobile Phone    ADDY WALKER (Mother) [phone] -- --               Discharge Summary      Annette Mijares MD at 01/22/20 0714          Inpatient Discharge Summary    BRIEF OVERVIEW  Admitting Provider: Annette Mijares MD  Discharge Provider: Annette Mijares MD  Primary Care Physician at Discharge: Marylou Meredith [phone]     Admission Date: 1/21/2020     Discharge Date: No discharge date for patient " encounter.    Primary Discharge Diagnosis  Status post total vaginal hysterectomy, left salpingectomy, umbilical hernia repair    Secondary Discharge Diagnosis  Cervical dysplasia  Umbilical hernia    Discharge Disposition  Home or Self Care  Code Status at Discharge: Full    Active Issues Requiring Follow-up  Postoperative state    Outpatient Follow-Up  No future appointments.      Test Results Pending at Discharge   Order Current Status    CBC & Differential Collected (01/22/20 0702)    CBC Auto Differential Collected (01/22/20 0702)    Tissue Pathology Exam In process          DETAILS OF HOSPITAL STAY    Presenting Problem/History of Present Illness  Incisional hernia, without obstruction or gangrene [K43.2]  Incisional hernia, without obstruction or gangrene [K43.2]  Incisional hernia, without obstruction or gangrene [K43.2]  Cervical dysplasia     Hospital Course  The patient was admitted from pre-op. She underwent total vaginal hysterectomy, left salpingectomy with Dr. Mijares and umbilical hernia repair with Dr. Garcia. Findings included: Normal uterus and bilateral ovaries; Chronically incarcerated periumbilical incisional hernia. See operative note for details. She was taken to PACU postoperatively and subsequently admitted to the floor for observation. On POD1 she was ambulating, tolerating po, having flatus, and pain was controlled. Govea was removed AM of POD1; void is pending this am. BP was stable at 90s/60s without tachycardia.  UOP was adequate. Am CBC was pending. She was deemed stable for discharge home pending spontaneous void and am CBC results. She will be discharged to home with plans to f/u with Dr. Mijares in 2 weeks.     Operative Procedures Performed  Procedure(s):  TOTAL VAGINAL HYSTERECTOMY WITH LEFT SALPINGECTOMY  VENTRAL/INCISIONAL HERNIA REPAIR WITH 4.3 CM MESH  Treatments: Pain control  Consults: none  Procedures: None  Pertinent Test Results: labs: pending    Physical Exam at  "Discharge  Discharge Condition: good  Heart Rate: 73  Resp: 16  BP: 92/55  Temp: 98.6 °F (37 °C)  Weight: 65.3 kg (143 lb 15.4 oz)  BP 92/55 (BP Location: Left arm, Patient Position: Lying)   Pulse 73   Temp 98.6 °F (37 °C) (Oral)   Resp 16   Ht 165.1 cm (65\")   Wt 65.3 kg (143 lb 15.4 oz)   SpO2 98%   BMI 23.96 kg/m²          Electronically signed by Annette Mijares MD at 01/22/20 1433       "

## 2020-01-24 ENCOUNTER — READMISSION MANAGEMENT (OUTPATIENT)
Dept: CALL CENTER | Facility: HOSPITAL | Age: 35
End: 2020-01-24

## 2020-01-24 NOTE — DISCHARGE PLACEMENT REQUEST
"Candie Dumont (34 y.o. Female)     Date of Birth Social Security Number Address Home Phone MRN    1985  [address] [place] [place] [address] [phone] [id number]    Restoration Marital Status          None        Admission Date Admission Type Admitting Provider Attending Provider Department, Room/Bed    1/21/20 Elective Annette Mijares MD  [facility] MOTHER BABY, M413/1    Discharge Date Discharge Disposition Discharge Destination        1/22/2020 Home or Self Care              Attending Provider:  (none)   Allergies:  Contrast Dye    Isolation:  None   Infection:  None   Code Status:  Prior    Ht:  165.1 cm (65\")   Wt:  65.3 kg (143 lb 15.4 oz)    Admission Cmt:  None   Principal Problem:  Incisional hernia, without obstruction or gangrene [K43.2]                 Active Insurance as of 1/21/2020     Primary Coverage     Payor Plan Insurance Group Employer/Plan Group    [employer]UE [employer]IELD PPO [id number]     Payor Plan Address Payor Plan Phone Number Payor Plan Fax Number Effective Dates    [address] [phone]  1/1/2019 - None Entered    [place] [address]       Subscriber Name Subscriber Birth Date Member ID       CANDIE DUMONT 1985 [id number]                 Emergency Contacts      (Rel.) Home Phone Work Phone Mobile Phone    ADDY WALKER (Mother) [phone] -- --               Discharge Summary      Annette Mijares MD at 01/22/20 0714          Inpatient Discharge Summary    BRIEF OVERVIEW  Admitting Provider: Annette Mijares MD  Discharge Provider: Annette Mijares MD  Primary Care Physician at Discharge: Marylou Meredith [phone]     Admission Date: 1/21/2020     Discharge Date: No discharge date for patient encounter.    Primary Discharge Diagnosis  Status post total vaginal hysterectomy, left salpingectomy, umbilical hernia repair    Secondary Discharge Diagnosis  Cervical " dysplasia  Umbilical hernia    Discharge Disposition  Home or Self Care  Code Status at Discharge: Full    Active Issues Requiring Follow-up  Postoperative state    Outpatient Follow-Up  No future appointments.      Test Results Pending at Discharge   Order Current Status    CBC & Differential Collected (01/22/20 0702)    CBC Auto Differential Collected (01/22/20 0702)    Tissue Pathology Exam In process          DETAILS OF HOSPITAL STAY    Presenting Problem/History of Present Illness  Incisional hernia, without obstruction or gangrene [K43.2]  Incisional hernia, without obstruction or gangrene [K43.2]  Incisional hernia, without obstruction or gangrene [K43.2]  Cervical dysplasia     Hospital Course  The patient was admitted from pre-op. She underwent total vaginal hysterectomy, left salpingectomy with Dr. Mijares and umbilical hernia repair with Dr. Garcia. Findings included: Normal uterus and bilateral ovaries; Chronically incarcerated periumbilical incisional hernia. See operative note for details. She was taken to PACU postoperatively and subsequently admitted to the floor for observation. On POD1 she was ambulating, tolerating po, having flatus, and pain was controlled. Govea was removed AM of POD1; void is pending this am. BP was stable at 90s/60s without tachycardia.  UOP was adequate. Am CBC was pending. She was deemed stable for discharge home pending spontaneous void and am CBC results. She will be discharged to home with plans to f/u with Dr. Mijares in 2 weeks.     Operative Procedures Performed  Procedure(s):  TOTAL VAGINAL HYSTERECTOMY WITH LEFT SALPINGECTOMY  VENTRAL/INCISIONAL HERNIA REPAIR WITH 4.3 CM MESH  Treatments: Pain control  Consults: none  Procedures: None  Pertinent Test Results: labs: pending    Physical Exam at Discharge  Discharge Condition: good  Heart Rate: 73  Resp: 16  BP: 92/55  Temp: 98.6 °F (37 °C)  Weight: 65.3 kg (143 lb 15.4 oz)  BP 92/55 (BP Location: Left arm, Patient  "Position: Lying)   Pulse 73   Temp 98.6 °F (37 °C) (Oral)   Resp 16   Ht 165.1 cm (65\")   Wt 65.3 kg (143 lb 15.4 oz)   SpO2 98%   BMI 23.96 kg/m²          Electronically signed by Annette Mijares MD at 01/22/20 1433       "

## (undated) DEVICE — SUT PDS 2/0 CT2 27IN Z333H

## (undated) DEVICE — DRAINBAG,ANTI-REFLUX TOWER,L/F,2000ML,LL: Brand: MEDLINE

## (undated) DEVICE — PK MINOR LAPAROTOMY 50

## (undated) DEVICE — SYR LUERLOK 30CC

## (undated) DEVICE — SUT VIC COAT 2/0 WO/NDL 18IN

## (undated) DEVICE — UNDERGLV SURG BIOGEL INDICATOR LTX PF 7

## (undated) DEVICE — UNDYED BRAIDED (POLYGLACTIN 910), SYNTHETIC ABSORBABLE SUTURE: Brand: COATED VICRYL

## (undated) DEVICE — SLENDER TIP INSTRUMENT: Brand: VITAL VUE

## (undated) DEVICE — SUT VIC 3/0 SH 27IN J416H

## (undated) DEVICE — CATHETER,FOLEY,100%SILICONE,18FR,10ML,LF: Brand: MEDLINE

## (undated) DEVICE — PK PROC TURNOVER

## (undated) DEVICE — PENCL HND ROCKRSWTCH HOLSTR EZ CLEAN TP CRD 10FT

## (undated) DEVICE — SUT VIC 3/0 CT2 27IN J232H

## (undated) DEVICE — SUT ETHIB TICRON/SURGIDAC NO0 CT 2 30GRN BX/36

## (undated) DEVICE — INTENDED FOR TISSUE SEPARATION, AND OTHER PROCEDURES THAT REQUIRE A SHARP SURGICAL BLADE TO PUNCTURE OR CUT.: Brand: BARD-PARKER ® CARBON RIB-BACK BLADES

## (undated) DEVICE — UNDERGLV SURG BIOGEL INDICAT PF 61/2 GRN

## (undated) DEVICE — SOL LACTATED RINGER 1000ML

## (undated) DEVICE — DECANTER: Brand: UNBRANDED

## (undated) DEVICE — PK GYN LAPAROSCOPY 50

## (undated) DEVICE — SUT VIC 2/0 SH 27IN

## (undated) DEVICE — SUT VIC 1 CTX 36IN J977H

## (undated) DEVICE — CUFF SCD HEMOFORCE SEQ CALF STD MD

## (undated) DEVICE — SUT ETHIB 0 CT1 CR8 18IN CX21D

## (undated) DEVICE — GLV SURG BIOGEL SENSR LTX PF SZ6.5

## (undated) DEVICE — 3M™ STERI-STRIP™ REINFORCED ADHESIVE SKIN CLOSURES, R1547, 1/2 IN X 4 IN (12 MM X 100 MM), 6 STRIPS/ENVELOPE: Brand: 3M™ STERI-STRIP™

## (undated) DEVICE — NDL SPINE 20G 3 1/2 YEL STRL 1P/U

## (undated) DEVICE — ADHS LIQ MASTISOL 2/3ML

## (undated) DEVICE — NDL HYPO PRECISIONGLIDE/REG 20G 1IN YEL

## (undated) DEVICE — SOL IRRIG NACL 9PCT 1000ML BTL

## (undated) DEVICE — SUT VIC 2/0 CT1 CR8 18IN J839D

## (undated) DEVICE — SOL IRRIG H2O 1000ML STRL

## (undated) DEVICE — GLV SURG TRIUMPH LT PF LTX 7 STRL

## (undated) DEVICE — SUT GUT CHROMIC  3/0 27IN CT2 GS22  882H

## (undated) DEVICE — DRSNG WND BORDR/ADHS NONADHR/GZ LF 4X4IN STRL

## (undated) DEVICE — VIOLET BRAIDED (POLYGLACTIN 910), SYNTHETIC ABSORBABLE SUTURE: Brand: COATED VICRYL

## (undated) DEVICE — ENSEAL 20 CM SHAFT, LARGE JAW: Brand: ENSEAL X1

## (undated) DEVICE — SYR LL TP 10ML STRL